# Patient Record
Sex: FEMALE | Race: WHITE | NOT HISPANIC OR LATINO | URBAN - METROPOLITAN AREA
[De-identification: names, ages, dates, MRNs, and addresses within clinical notes are randomized per-mention and may not be internally consistent; named-entity substitution may affect disease eponyms.]

---

## 2017-08-26 ENCOUNTER — OFFICE VISIT - HEALTHEAST (OUTPATIENT)
Dept: FAMILY MEDICINE | Facility: CLINIC | Age: 37
End: 2017-08-26

## 2017-08-26 DIAGNOSIS — J02.9 PHARYNGITIS: ICD-10-CM

## 2019-10-22 ENCOUNTER — OFFICE VISIT - HEALTHEAST (OUTPATIENT)
Dept: FAMILY MEDICINE | Facility: CLINIC | Age: 39
End: 2019-10-22

## 2019-10-22 ENCOUNTER — RECORDS - HEALTHEAST (OUTPATIENT)
Dept: GENERAL RADIOLOGY | Facility: CLINIC | Age: 39
End: 2019-10-22

## 2019-10-22 DIAGNOSIS — M25.562 ACUTE PAIN OF LEFT KNEE: ICD-10-CM

## 2019-10-22 DIAGNOSIS — M25.562 PAIN IN LEFT KNEE: ICD-10-CM

## 2019-10-22 ASSESSMENT — MIFFLIN-ST. JEOR: SCORE: 1540.86

## 2020-07-21 ENCOUNTER — COMMUNICATION - HEALTHEAST (OUTPATIENT)
Dept: FAMILY MEDICINE | Facility: CLINIC | Age: 40
End: 2020-07-21

## 2020-07-22 ENCOUNTER — OFFICE VISIT - HEALTHEAST (OUTPATIENT)
Dept: FAMILY MEDICINE | Facility: CLINIC | Age: 40
End: 2020-07-22

## 2020-07-22 DIAGNOSIS — M25.562 CHRONIC PAIN OF LEFT KNEE: ICD-10-CM

## 2020-07-22 DIAGNOSIS — G89.29 CHRONIC PAIN OF LEFT KNEE: ICD-10-CM

## 2020-07-22 DIAGNOSIS — Z01.818 PREOP GENERAL PHYSICAL EXAM: ICD-10-CM

## 2020-07-22 LAB
HCG UR QL: NEGATIVE
HGB BLD-MCNC: 12.8 G/DL (ref 12–16)

## 2020-07-22 ASSESSMENT — MIFFLIN-ST. JEOR: SCORE: 1525.54

## 2020-08-24 ENCOUNTER — OFFICE VISIT - HEALTHEAST (OUTPATIENT)
Dept: FAMILY MEDICINE | Facility: CLINIC | Age: 40
End: 2020-08-24

## 2020-08-24 DIAGNOSIS — G89.29 CHRONIC PAIN OF LEFT KNEE: ICD-10-CM

## 2020-08-24 DIAGNOSIS — Z01.818 PREOP GENERAL PHYSICAL EXAM: ICD-10-CM

## 2020-08-24 DIAGNOSIS — M25.562 CHRONIC PAIN OF LEFT KNEE: ICD-10-CM

## 2020-08-24 LAB
HCG UR QL: NEGATIVE
HGB BLD-MCNC: 12.7 G/DL (ref 12–16)

## 2020-08-24 ASSESSMENT — MIFFLIN-ST. JEOR: SCORE: 1534.62

## 2020-10-29 ENCOUNTER — OFFICE VISIT - HEALTHEAST (OUTPATIENT)
Dept: FAMILY MEDICINE | Facility: CLINIC | Age: 40
End: 2020-10-29

## 2020-10-29 DIAGNOSIS — H10.32 ACUTE CONJUNCTIVITIS OF LEFT EYE, UNSPECIFIED ACUTE CONJUNCTIVITIS TYPE: ICD-10-CM

## 2021-05-31 VITALS — WEIGHT: 201 LBS | BODY MASS INDEX: 35.61 KG/M2

## 2021-06-02 NOTE — PROGRESS NOTES
Assessment and Plan:     1. Acute pain of left knee  Differentials include patellofemoral syndrome, patella tracking syndrome, meniscus injury, tendinitis.  Discussed symptomatic treatment including rest, ice, elevation, ibuprofen.  Offered referral to orthopedics or physical therapy, but she declines.  She is questioning if she should have an MRI.  Ultimately, we decided to wait to see if her symptoms improve over the next few weeks.  If they do not improve, patient states she will be interested in MRI for further evaluation.  She is content with the plan.  - XR Knee Left 1 or 2 VWS; Future    Subjective:     Lara is a 38 y.o. female presenting to the clinic for concerns for left knee pain for 1 month.  Patient denies any known injury but did note bruising around her patella on September 24.  Patient felt as though her patella had moved out of place.  Patient now feels a popping sensation within the knee both during ambulation and walking upstairs.  Patient states the pain is an intermittent ache within the medial aspect of the knee and below her patella.  She denies any recent travel.  She has been taking ibuprofen.  She has found some gradual improvement with her symptoms over the past month.    Review of Systems: A complete 14 point review of systems was obtained and is negative or as stated in the history of present illness.    Social History     Socioeconomic History     Marital status: Single     Spouse name: Not on file     Number of children: Not on file     Years of education: Not on file     Highest education level: Not on file   Occupational History     Not on file   Social Needs     Financial resource strain: Not on file     Food insecurity:     Worry: Not on file     Inability: Not on file     Transportation needs:     Medical: Not on file     Non-medical: Not on file   Tobacco Use     Smoking status: Never Smoker     Smokeless tobacco: Never Used   Substance and Sexual Activity     Alcohol use: Not  "on file     Drug use: Not on file     Sexual activity: Not on file   Lifestyle     Physical activity:     Days per week: Not on file     Minutes per session: Not on file     Stress: Not on file   Relationships     Social connections:     Talks on phone: Not on file     Gets together: Not on file     Attends Anglican service: Not on file     Active member of club or organization: Not on file     Attends meetings of clubs or organizations: Not on file     Relationship status: Not on file     Intimate partner violence:     Fear of current or ex partner: Not on file     Emotionally abused: Not on file     Physically abused: Not on file     Forced sexual activity: Not on file   Other Topics Concern     Not on file   Social History Narrative     Not on file       Active Ambulatory Problems     Diagnosis Date Noted     Joint Pain, Localized In The Hip      Acute Pharyngitis      Anxiety 11/17/2015     Resolved Ambulatory Problems     Diagnosis Date Noted     No Resolved Ambulatory Problems     No Additional Past Medical History       No family history on file.    Objective:     /70   Pulse 86   Temp 98.3  F (36.8  C) (Oral)   Resp 20   Ht 5' 3\" (1.6 m)   Wt 201 lb (91.2 kg)   SpO2 99%   BMI 35.61 kg/m      Patient is alert, in no obvious distress.   Skin: Warm, dry.    Lungs:  Clear to auscultation. Respirations even and unlabored.  No wheezing or rales noted.   Heart:  Regular rate and rhythm.  No murmurs, S3, S4, gallops, or rubs.    Musculoskeletal:  She has full ROM of her left knee.  She has some slight bruising inferior to her patella.  Valgus, Varus, Lachman's and Sindy's are negative.  She ambulates without difficulty.     LABORATORY: I ordered and personally reviewed left knee x-rays showing no obvious fracture.  Will have radiology review.                "

## 2021-06-03 VITALS
TEMPERATURE: 98.3 F | SYSTOLIC BLOOD PRESSURE: 122 MMHG | RESPIRATION RATE: 20 BRPM | OXYGEN SATURATION: 99 % | DIASTOLIC BLOOD PRESSURE: 70 MMHG | HEIGHT: 63 IN | WEIGHT: 201 LBS | HEART RATE: 86 BPM | BODY MASS INDEX: 35.61 KG/M2

## 2021-06-04 VITALS
SYSTOLIC BLOOD PRESSURE: 122 MMHG | BODY MASS INDEX: 33.63 KG/M2 | HEART RATE: 77 BPM | HEIGHT: 64 IN | DIASTOLIC BLOOD PRESSURE: 68 MMHG | OXYGEN SATURATION: 98 % | WEIGHT: 197 LBS

## 2021-06-04 VITALS
BODY MASS INDEX: 33.29 KG/M2 | OXYGEN SATURATION: 98 % | WEIGHT: 195 LBS | SYSTOLIC BLOOD PRESSURE: 112 MMHG | HEIGHT: 64 IN | DIASTOLIC BLOOD PRESSURE: 74 MMHG | HEART RATE: 80 BPM

## 2021-06-05 VITALS
BODY MASS INDEX: 34.67 KG/M2 | DIASTOLIC BLOOD PRESSURE: 64 MMHG | SYSTOLIC BLOOD PRESSURE: 96 MMHG | OXYGEN SATURATION: 99 % | HEART RATE: 76 BPM | TEMPERATURE: 98.4 F | WEIGHT: 200.4 LBS

## 2021-06-09 NOTE — PROGRESS NOTES
Preoperative Exam    Scheduled Procedure: Trimming Miniscus  Surgery Date:  8/6/2020  Surgery Location: [facility] Fax: [phone]  Surgeon:  Dr. Santino Nino    Assessment/Plan:     Lara was seen today for pre-op exam.    Preop general physical exam, Chronic pain of left knee  -     Hemoglobin  -     Pregnancy (Beta-hCG, Qual), Urine  -     JIC GRN    Surgical Procedure Risk: Low (reported cardiac risk generally < 1%)  Have you had prior anesthesia?: No  Have you or any family members had a previous anesthesia reaction:  No  Do you or any family members have a history of a clotting or bleeding disorder?: Yes: dad has had clott after surgery   Cardiac Risk Assessment: no increased risk for major cardiac complications    APPROVAL GIVEN to proceed with proposed procedure, without further diagnostic evaluation    Functional Status: Independent  Patient plans to recover at home with family.     Subjective:      Lara Franco is a 39 y.o. female who presents for a preoperative consultation.      All other systems reviewed and are negative, other than those listed in the HPI.    Pertinent History  Do you have difficulty breathing or chest pain after walking up a flight of stairs: No  History of obstructive sleep apnea: No  Steroid use in the last 6 months: No  Frequent Aspirin/NSAID use: No  Prior Blood Transfusion: No  Prior Blood Transfusion Reaction: No  If for some reason prior to, during or after the procedure, if it is medically indicated, would you be willing to have a blood transfusion?:  There is no transfusion refusal.    Current Outpatient Medications   Medication Sig Dispense Refill     LEVONORGESTREL-ETHIN ESTRADIOL (LEVORA 0.15/30, 28, ORAL) Take by mouth daily.       No current facility-administered medications for this visit.         No Known Allergies    Patient Active Problem List   Diagnosis     Anxiety       No past medical history on file.    No past surgical history on file.    Social  "History     Socioeconomic History     Marital status: Single     Spouse name: Not on file     Number of children: Not on file     Years of education: Not on file     Highest education level: Not on file   Occupational History     Not on file   Social Needs     Financial resource strain: Not on file     Food insecurity     Worry: Not on file     Inability: Not on file     Transportation needs     Medical: Not on file     Non-medical: Not on file   Tobacco Use     Smoking status: Never Smoker     Smokeless tobacco: Never Used   Substance and Sexual Activity     Alcohol use: Not on file     Drug use: Not on file     Sexual activity: Not on file   Lifestyle     Physical activity     Days per week: Not on file     Minutes per session: Not on file     Stress: Not on file   Relationships     Social connections     Talks on phone: Not on file     Gets together: Not on file     Attends Nondenominational service: Not on file     Active member of club or organization: Not on file     Attends meetings of clubs or organizations: Not on file     Relationship status: Not on file     Intimate partner violence     Fear of current or ex partner: Not on file     Emotionally abused: Not on file     Physically abused: Not on file     Forced sexual activity: Not on file   Other Topics Concern     Not on file   Social History Narrative     Not on file       Patient Care Team:  Beverly Dacosta MD as PCP - General (Family Medicine)  Colette Lawrence CNP as Assigned PCP          Objective:     Vitals:    07/22/20 1503   BP: 112/74   Pulse: 80   SpO2: 98%   Weight: 195 lb (88.5 kg)   Height: 5' 3.75\" (1.619 m)   LMP: 07/13/2020         Physical Exam:    Objective:    Physical Exam   Vitals:    07/22/20 1503   BP: 112/74   Pulse: 80   SpO2: 98%      Constitutional: Patient is oriented to person, place, and time. Patient appears well-developed and well-nourished. No distress.   Head: Normocephalic and atraumatic.   Right Ear: External ear normal. " Normal TM  Left Ear: External ear normal. Normal TM  Nose: Nose normal.   Mouth/Throat: Oropharynx is clear and moist. No oropharyngeal exudate.   Eyes: Conjunctivae and EOM are normal. Pupils are equal, round, and reactive to light. Right eye exhibits no discharge. Left eye exhibits no discharge. No scleral icterus.   Neck: Neck supple. No JVD present. No tracheal deviation present. No thyromegaly present.   Cardiovascular: Normal rate, regular rhythm, normal heart sounds and intact distal pulses. No murmur heard.   Pulmonary/Chest: Effort normal and breath sounds normal. No stridor. No respiratory distress. Patient has no wheezes, no rales, exhibits no tenderness.   Abdominal: Soft. Bowel sounds are normal. Patient exhibits no distension and no mass. There is no tenderness. There is no rebound and no guarding.   Lymphadenopathy:  Patient has no cervical adenopathy.   Neurological: Patient is alert and oriented to person, place, and time. Patient has normal reflexes. No cranial nerve deficit. Coordination normal.   Skin: Skin is warm and dry. No rash noted. Patient is not diaphoretic. No erythema. No pallor.     Results for orders placed or performed in visit on 07/22/20   Hemoglobin   Result Value Ref Range    Hemoglobin 12.8 12.0 - 16.0 g/dL   Pregnancy (Beta-hCG, Qual), Urine   Result Value Ref Range    Pregnancy Test, Urine Negative Negative        Immunization History   Administered Date(s) Administered     Influenza, seasonal,quad inj 6-35 mos 10/23/2014     Influenza,seasonal quad, PF, =/> 6months 11/17/2015     Tdap 05/19/2008, 08/23/2019           Electronically signed by Bill Bradshaw MD 07/22/20 11:26 AM

## 2021-06-09 NOTE — TELEPHONE ENCOUNTER
New Appointment Needed  What is the reason for the visit: pre op - 8/6 - knee surgery - [facility] in [place] - dr. santino nino   Pre-Op Appt Request  When is the surgery? :  8/6  Where is the surgery?:   [facility] in [place]  Who is the surgeon? :  Dr. Santino Nino  What type of surgery is being done?: knee surgery  Provider Preference: Any available  How soon do you need to be seen?: whenever possible  Waitlist offered?: No  Okay to leave a detailed message:  Yes

## 2021-06-09 NOTE — TELEPHONE ENCOUNTER
Left message to call back for: Lara   Information to relay to patient:  Please help schedule preop appointment with any FM/OB provider here at [facility] with openings.     LEI Kincaid

## 2021-06-10 NOTE — PROGRESS NOTES
Preoperative Exam    Scheduled Procedure: knee left surgery- trimming  Surgery Date:  09/03/20  Surgery Location: [facility] in [place]    Surgeon:  Santino Clements    Assessment/Plan:     Preop general physical exam, Chronic pain of left knee  -     Hemoglobin  -     Pregnancy (Beta-hCG, Qual), Urine    Surgical Procedure Risk: Low (reported cardiac risk generally < 1%)  Have you had prior anesthesia?: No  Have you or any family members had a previous anesthesia reaction:  No  Do you or any family members have a history of a clotting or bleeding disorder?: No  Cardiac Risk Assessment: no increased risk for major cardiac complications    APPROVAL GIVEN to proceed with proposed procedure, without further diagnostic evaluation    Functional Status: Independent  Patient plans to recover at home with family.     Subjective:      Lara Franco is a 39 y.o. female who presents for a preoperative consultation.    All other systems reviewed and are negative, other than those listed in the HPI.  Had positive COVD19 on 84/20    Pertinent History  Do you have difficulty breathing or chest pain after walking up a flight of stairs: No  History of obstructive sleep apnea: No  Steroid use in the last 6 months: No  Frequent Aspirin/NSAID use: No  Prior Blood Transfusion: No  Prior Blood Transfusion Reaction: No  If for some reason prior to, during or after the procedure, if it is medically indicated, would you be willing to have a blood transfusion?:  There is no transfusion refusal.    Current Outpatient Medications   Medication Sig Dispense Refill     LEVONORGESTREL-ETHIN ESTRADIOL (LEVORA 0.15/30, 28, ORAL) Take by mouth daily.       No current facility-administered medications for this visit.         No Known Allergies    Patient Active Problem List   Diagnosis     Anxiety       No past medical history on file.    No past surgical history on file.    Social History     Socioeconomic History     Marital status: Single     Spouse name:  "Not on file     Number of children: Not on file     Years of education: Not on file     Highest education level: Not on file   Occupational History     Not on file   Social Needs     Financial resource strain: Not on file     Food insecurity     Worry: Not on file     Inability: Not on file     Transportation needs     Medical: Not on file     Non-medical: Not on file   Tobacco Use     Smoking status: Never Smoker     Smokeless tobacco: Never Used   Substance and Sexual Activity     Alcohol use: Not on file     Drug use: Not on file     Sexual activity: Not on file   Lifestyle     Physical activity     Days per week: Not on file     Minutes per session: Not on file     Stress: Not on file   Relationships     Social connections     Talks on phone: Not on file     Gets together: Not on file     Attends Zoroastrian service: Not on file     Active member of club or organization: Not on file     Attends meetings of clubs or organizations: Not on file     Relationship status: Not on file     Intimate partner violence     Fear of current or ex partner: Not on file     Emotionally abused: Not on file     Physically abused: Not on file     Forced sexual activity: Not on file   Other Topics Concern     Not on file   Social History Narrative     Not on file       Patient Care Team:  Beverly Dacosta MD as PCP - General (Family Medicine)  Bill Hatch MD as Assigned PCP          Objective:     Vitals:    08/24/20 1246   BP: 122/68   Pulse: 77   SpO2: 98%   Weight: 197 lb (89.4 kg)   Height: 5' 3.75\" (1.619 m)   LMP: 08/10/2020         Physical Exam:    Objective:    Physical Exam   Vitals:    08/24/20 1246   BP: 122/68   Pulse: 77   SpO2: 98%      Constitutional: Patient is oriented to person, place, and time. Patient appears well-developed and well-nourished. No distress.   Head: Normocephalic and atraumatic.   Right Ear: External ear normal. Normal TM  Left Ear: External ear normal. Normal TM  Nose: Nose " normal.   Mouth/Throat: Oropharynx is clear and moist. No oropharyngeal exudate.   Eyes: Conjunctivae and EOM are normal. Pupils are equal, round, and reactive to light. Right eye exhibits no discharge. Left eye exhibits no discharge. No scleral icterus.   Neck: Neck supple. No JVD present. No tracheal deviation present. No thyromegaly present.   Cardiovascular: Normal rate, regular rhythm, normal heart sounds and intact distal pulses. No murmur heard.   Pulmonary/Chest: Effort normal and breath sounds normal. No stridor. No respiratory distress. Patient has no wheezes, no rales, exhibits no tenderness.   Abdominal: Soft. Bowel sounds are normal. Patient exhibits no distension and no mass. There is no tenderness. There is no rebound and no guarding.   Lymphadenopathy:  Patient has no cervical adenopathy.   Neurological: Patient is alert and oriented to person, place, and time. Patient has normal reflexes. No cranial nerve deficit. Coordination normal.   Skin: Skin is warm and dry. No rash noted. Patient is not diaphoretic. No erythema. No pallor.       Electronically signed by Bill Bradshaw MD 08/24/20 2:35 PM

## 2021-06-12 NOTE — PROGRESS NOTES
SUBJECTIVE:   Pt presents with sore throat for 3 days. Fever absent. Other symptoms: She feels like she needs to constantly clear her throat. [facility] and [facility] tests were negative.    She works at a .        OBJECTIVE:   Appears mildly ill.  Ears: normal  Eyes: no redness or discharge  Nose: no discharge  Oropharynx: slight swelling of the uvula otherwise normal appearing.  Neck: no adenopathy  Lungs: unlabored breathing  Skin: no strep-like sandpaper rash.      Rapid Strep test: negative    ASSESSMENT:   Pharyngitis with a negative RST. No evidence for peritonsillar abscess    PLAN:   Step RNA test is pending and if positive, we will call in a Rx for an antibiotic  Use acetaminophen or other OTC analgesic.   F/U prn.

## 2021-06-12 NOTE — PROGRESS NOTES
ASSESSMENT:  1. Acute conjunctivitis of left eye, unspecified acute conjunctivitis type    Gave her some drops that she can use for the next several days just in case it represents an early pinkeye.  I reassured her that I do not see any foreign body nor any scratch on her cornea.  I would assume that she would get better over the next several days.  I encouraged warm compresses to the area and pain medication as needed.      - tobramycin (TOBREX) 0.3 % ophthalmic solution; Administer 1 drop into the left eye every 6 (six) hours for 5 days.  Dispense: 5 mL; Refill: 0          PLAN:  There are no Patient Instructions on file for this visit.    No orders of the defined types were placed in this encounter.    There are no discontinued medications.    No follow-ups on file.    CHIEF COMPLAINT:  Chief Complaint   Patient presents with     Eye Problem     Lt eye irritation, may have scratched her eye yesterday morning, does not hurt now but feels a little dry,        HISTORY OF PRESENT ILLNESS:  Lara is a 39 y.o. female presenting to the clinic today with some left eye discomfort and redness.  She states that yesterday it felt like she had something in the eye.  She tried to rinse it out and the eye just became a bit red on the sclera on the medial part of her eye.  Her vision remained okay with no blurry vision noted.  She woke this morning with no mattering or discharge in the eye.  She still had some persistent redness of the sclera on the medial side of the eye.  There is no puffiness to the eyelids.  No spread to the right eye.    REVIEW OF SYSTEMS:     All other systems are negative.    PFSH:    Reviewed      TOBACCO USE:  Social History     Tobacco Use   Smoking Status Never Smoker   Smokeless Tobacco Never Used       VITALS:  Vitals:    10/29/20 1138   BP: 96/64   Patient Site: Left Arm   Patient Position: Sitting   Cuff Size: Adult Large   Pulse: 76   Temp: 98.4  F (36.9  C)   SpO2: 99%   Weight: 200 lb 6.4 oz  (90.9 kg)     Wt Readings from Last 3 Encounters:   10/29/20 200 lb 6.4 oz (90.9 kg)   08/24/20 197 lb (89.4 kg)   07/22/20 195 lb (88.5 kg)     Body mass index is 34.67 kg/m .    PHYSICAL EXAM:  Constitutional:  Well appearing patient in no acute distress.  Vitals:  Per nursing notes.    HEENT:  Normocephalic, atraumatic.  Ears are clear bilaterally, with no fluid or redness, and landmarks visible.  Pupils are equal and reactive to light, extraocular muscles intact, visual fields are full.  Nose is normal, and oropharynx is clear without redness.    Neck is without lymphadenopathy.    Fluorescein dye applied and shows no obvious evidence of a corneal abrasion.  The sclera is indeed red on the medial side of the left eye.    MEDICATIONS:  Current Outpatient Medications   Medication Sig Dispense Refill     LEVONORGESTREL-ETHIN ESTRADIOL (LEVORA 0.15/30, 28, ORAL) Take by mouth daily.       tobramycin (TOBREX) 0.3 % ophthalmic solution Administer 1 drop into the left eye every 6 (six) hours for 5 days. 5 mL 0     No current facility-administered medications for this visit.

## 2021-06-27 ENCOUNTER — HEALTH MAINTENANCE LETTER (OUTPATIENT)
Age: 41
End: 2021-06-27

## 2021-10-16 ENCOUNTER — HEALTH MAINTENANCE LETTER (OUTPATIENT)
Age: 41
End: 2021-10-16

## 2022-07-23 ENCOUNTER — HEALTH MAINTENANCE LETTER (OUTPATIENT)
Age: 42
End: 2022-07-23

## 2022-10-01 ENCOUNTER — HEALTH MAINTENANCE LETTER (OUTPATIENT)
Age: 42
End: 2022-10-01

## 2023-08-06 ENCOUNTER — HEALTH MAINTENANCE LETTER (OUTPATIENT)
Age: 43
End: 2023-08-06

## 2023-12-24 ENCOUNTER — HEALTH MAINTENANCE LETTER (OUTPATIENT)
Age: 43
End: 2023-12-24